# Patient Record
Sex: MALE | NOT HISPANIC OR LATINO | Employment: FULL TIME | URBAN - NONMETROPOLITAN AREA
[De-identification: names, ages, dates, MRNs, and addresses within clinical notes are randomized per-mention and may not be internally consistent; named-entity substitution may affect disease eponyms.]

---

## 2020-02-11 ENCOUNTER — OFFICE VISIT CONVERTED (OUTPATIENT)
Dept: FAMILY MEDICINE CLINIC | Age: 45
End: 2020-02-11
Attending: NURSE PRACTITIONER

## 2020-11-24 ENCOUNTER — HOSPITAL ENCOUNTER (OUTPATIENT)
Dept: OTHER | Facility: HOSPITAL | Age: 45
Discharge: HOME | End: 2020-11-24
Attending: FAMILY MEDICINE

## 2020-11-27 LAB — SARS-COV-2 RNA SPEC QL NAA+PROBE: NOT DETECTED

## 2021-05-18 NOTE — PROGRESS NOTES
"Ella Alicea  1975     Office/Outpatient Visit    Visit Date: Tue, Feb 11, 2020 03:20 pm    Provider: Cherelle Manzo N.P. (Assistant: Spurling, Sarah C, MA)    Location: [facility]        Electronically signed by Cherelle Manzo N.P. on  02/11/2020 03:56:42 PM                             Subjective:        CC: Mr. Alicea is a 44 year old White male.  This is his first visit to the clinic.  sinus symptoms, drainage, hoarseness in his voice.          HPI:           PHQ-9 Depression Screening: Completed form scanned and in chart; Total Score 0           He complains of a sore throat.  This began yesterday.  The discomfort occurs constantly.  Associated symptoms include cough and \"swollen glands\".  He denies chills, fever, headache or OTHER (enter).  He denies exposure to ill contacts.  He has not tried any medications for symptomatic relief.  Medical history is significant for smoking.      ROS:     CONSTITUTIONAL:  Negative for chills and fever.      E/N/T:  Positive for frequent rhinorrhea and sore throat.   Negative for ear pain, nasal congestion or sinus pressure.      CARDIOVASCULAR:  Negative for chest pain and pedal edema.      RESPIRATORY:  Negative for recent cough and dyspnea.      NEUROLOGICAL:  Negative for headaches.          Past Medical History / Family History / Social History:         Last Reviewed on 2/11/2020 03:35 PM by Cherelle Manzo    Past Medical History:             PAST MEDICAL HISTORY     UNREMARKABLE     right middle finger crush         Family History:     Father: Myocardial Infarction     Mother: Healthy         Social History:     Occupation: [employer]     Marital Status: Single     Children: None         Tobacco/Alcohol/Supplements:     Last Reviewed on 2/11/2020 03:35 PM by Cherelle Manzo    Tobacco: Current Smoker: He currently smokes every day, 1 pack per day.          Alcohol: Frequency:    'very rarely - maybe four times a year';        "  Substance Abuse History:     Last Reviewed on 2/11/2020 03:35 PM by Cherelle Manzo        Marijuana: Current use. 'occasional'         Mental Health History:     Last Reviewed on 2/11/2020 03:35 PM by Cherelle Manzo    NEGATIVE         Communicable Diseases (eg STDs):     Last Reviewed on 2/11/2020 03:35 PM by Cherelle Manzo    Reportable health conditions; NEGATIVE         Current Problems:     Last Reviewed on 2/11/2020 03:35 PM by Cherelle Manzo    Nicotine dependence, unspecified, uncomplicated    Acute pharyngitis, unspecified    Encounter for screening for depression        Immunizations:     None        Allergies:     Last Reviewed on 2/11/2020 03:35 PM by Cherelle Manzo    No Known Allergies.        Current Medications:     Last Reviewed on 2/11/2020 03:35 PM by Cherelle Manzo    No Known Medications.        Objective:        Vitals:         Current: 2/11/2020 3:27:10 PM    Ht:  5 ft, 10.75 in;  Wt: 144 lbs;  BMI: 20.2T: 97.6 F (oral);  BP: 119/67 mm Hg (left arm, sitting);  P: 63 bpm (left arm (BP Cuff), sitting)        Exams:     PHYSICAL EXAM:     GENERAL: Vitals recorded well developed, well nourished;  no apparent distress;     E/N/T: EARS: external auditory canal normal bilaterally;  bilateral TMs are normal;  NOSE:  normal nasal mucosa, septum, turbinates, and sinuses; OROPHARYNX: posterior pharynx shows 2+ tonsils, no exudate, and erythema;     NECK: range of motion is normal;     RESPIRATORY: normal appearance and symmetric expansion of chest wall; normal respiratory rate and pattern with no distress; normal breath sounds with no rales, rhonchi, wheezes or rubs;     LYMPHATIC: no enlargement of cervical or facial nodes;     MUSCULOSKELETAL:     NEUROLOGIC: mental status: alert and oriented x 3;     PSYCHIATRIC: appropriate affect and demeanor; normal speech pattern; normal thought and perception;         Assessment:         Z13.31   Encounter for screening for depression        "J02.9   Acute pharyngitis, unspecified       F17.200   Nicotine dependence, unspecified, uncomplicated           ORDERS:         Meds Prescribed:       [New Rx] Lidocaine Viscous 2 % Mucous Membrane Solution [take 15 milliliters and swish and spit out by oral route every 3 hours PRN  ], #100 (one hundred) milliliters, Refills: 0 (zero)       [New Rx] cetirizine 10 mg oral tablet [take 1 tablet (10 mg) by oral route once daily], #30 (thirty) tablets, Refills: 0 (zero)       [New Rx] Medrol (Henrry) 4 mg oral Tablet, Dose Pack [take as directed], #21 (twenty one) unspecified, Refills: 0 (zero)         Other Orders:         Depression screen negative  (In-House)            4004F  Pt scrnd tobacco use rcvd tobacco cessation talk  (In-House)                      Plan:         Encounter for screening for depression    MIPS PHQ-9 Depression Screening: Completed form scanned and in chart; Total Score 0; Negative Depression Screen           Orders:         Depression screen negative  (In-House)              Acute pharyngitis, unspecifiedappears viral - recommended that if no better in 7-10 days, will need to follow up in office  - no s/s strep - reports \"does not feel like strep\" deferred testing today per request and will follow up if no improvement or symptoms change          Prescriptions:       [New Rx] Lidocaine Viscous 2 % Mucous Membrane Solution [take 15 milliliters and swish and spit out by oral route every 3 hours PRN  ], #100 (one hundred) milliliters, Refills: 0 (zero)       [New Rx] cetirizine 10 mg oral tablet [take 1 tablet (10 mg) by oral route once daily], #30 (thirty) tablets, Refills: 0 (zero)       [New Rx] Medrol (Henrry) 4 mg oral Tablet, Dose Pack [take as directed], #21 (twenty one) unspecified, Refills: 0 (zero)         Nicotine dependence, unspecified, uncomplicated        RECOMMENDATIONS given include: Counseled on smoking cessation and advised of the benefits to patient's health if he were to " stop smoking. Counseling for less than 3 minutes.            Orders:       4004F  Pt scrnd tobacco use rcvd tobacco cessation talk  (In-House)                  Patient Recommendations:        For  Nicotine dependence, unspecified, uncomplicated:        Stop smoking.              Charge Capture:         Primary Diagnosis:     Z13.31  Encounter for screening for depression           Orders:      12468  Office visit - new pt, level 3  (In-House)              Depression screen negative  (In-House)              J02.9  Acute pharyngitis, unspecified     F17.200  Nicotine dependence, unspecified, uncomplicated           Orders:      4004F  Pt scrnd tobacco use rcvd tobacco cessation talk  (In-House)

## 2021-07-02 VITALS
TEMPERATURE: 97.6 F | HEIGHT: 71 IN | HEART RATE: 63 BPM | SYSTOLIC BLOOD PRESSURE: 119 MMHG | DIASTOLIC BLOOD PRESSURE: 67 MMHG | WEIGHT: 144 LBS | BODY MASS INDEX: 20.16 KG/M2

## 2022-01-11 ENCOUNTER — TRANSCRIBE ORDERS (OUTPATIENT)
Dept: ADMINISTRATIVE | Facility: HOSPITAL | Age: 47
End: 2022-01-11

## 2022-01-11 DIAGNOSIS — M84.475A: Primary | ICD-10-CM

## 2022-01-12 ENCOUNTER — HOSPITAL ENCOUNTER (OUTPATIENT)
Dept: CT IMAGING | Facility: HOSPITAL | Age: 47
Discharge: HOME | End: 2022-01-12
Admitting: PODIATRIST

## 2022-01-12 DIAGNOSIS — M84.475A: ICD-10-CM

## 2022-01-12 PROCEDURE — 73700 CT LOWER EXTREMITY W/O DYE: CPT

## 2022-01-21 ENCOUNTER — TELEPHONE (OUTPATIENT)
Dept: FAMILY MEDICINE CLINIC | Age: 47
End: 2022-01-21

## 2022-01-21 NOTE — TELEPHONE ENCOUNTER
Caller: Ella Alicea    Relationship: Self    Best call back number: [phone]    What orders are you requesting (i.e. lab or imaging):LAB AND X-RAYS    In what timeframe would the patient need to come in:      WEEK OF 1/24/22    Where will you receive your lab/imaging services:      Additional notes:       PATIENT IS REQUESTING PCP TO PUT IN AN ORDER FOR BLOOD WORK AND X-RAYS.  HE SAID HE IS HAVING SURGERY ON  2/11/22 AND IS NEEDING THOSE DONE BY 2/4/22. THE PATIENT IS WANTING TO HAVE THEM DONE BY NEXT WEEK. HE IS REQUESTING A CALL TO [facility] WHEN HE IS ABLE TO GO AND GET THIS DONE.       HE WOULD NEED THE  INFORMATION  OF HIS X-RAYS AND LABS SENT TO [facility].

## 2022-01-21 NOTE — TELEPHONE ENCOUNTER
Pt has an order for labs and xrays from his surgeon.  Told pt the lab doesn't take appts for regular xrays and labs, he just needs to take a order in with him.

## 2022-01-28 ENCOUNTER — TRANSCRIBE ORDERS (OUTPATIENT)
Dept: ADMINISTRATIVE | Facility: HOSPITAL | Age: 47
End: 2022-01-28

## 2022-01-28 ENCOUNTER — LAB (OUTPATIENT)
Dept: LAB | Facility: HOSPITAL | Age: 47
End: 2022-01-28

## 2022-01-28 ENCOUNTER — HOSPITAL ENCOUNTER (OUTPATIENT)
Dept: GENERAL RADIOLOGY | Facility: HOSPITAL | Age: 47
Discharge: HOME | End: 2022-01-28

## 2022-01-28 ENCOUNTER — HOSPITAL ENCOUNTER (OUTPATIENT)
Dept: GENERAL RADIOLOGY | Facility: HOSPITAL | Age: 47
Discharge: HOME | End: 2022-01-28
Admitting: PODIATRIST

## 2022-01-28 DIAGNOSIS — S92.013A: ICD-10-CM

## 2022-01-28 DIAGNOSIS — M25.572 ARTHRALGIA OF LEFT ANKLE: ICD-10-CM

## 2022-01-28 DIAGNOSIS — R60.9 EDEMA, UNSPECIFIED TYPE: ICD-10-CM

## 2022-01-28 DIAGNOSIS — R60.9 EDEMA, UNSPECIFIED TYPE: Primary | ICD-10-CM

## 2022-01-28 DIAGNOSIS — S92.013A: Primary | ICD-10-CM

## 2022-01-28 DIAGNOSIS — M25.572 ARTHRALGIA OF ANKLE, LEFT: ICD-10-CM

## 2022-01-28 LAB
25(OH)D3 SERPL-MCNC: 14.5 NG/ML (ref 30–100)
BASOPHILS # BLD AUTO: 0.06 10*3/MM3 (ref 0–0.2)
BASOPHILS NFR BLD AUTO: 0.8 % (ref 0–1.5)
DEPRECATED RDW RBC AUTO: 43.1 FL (ref 37–54)
EOSINOPHIL # BLD AUTO: 0.18 10*3/MM3 (ref 0–0.4)
EOSINOPHIL NFR BLD AUTO: 2.5 % (ref 0.3–6.2)
ERYTHROCYTE [DISTWIDTH] IN BLOOD BY AUTOMATED COUNT: 12.2 % (ref 12.3–15.4)
HCT VFR BLD AUTO: 41.7 % (ref 37.5–51)
HGB BLD-MCNC: 13.3 G/DL (ref 13–17.7)
IMM GRANULOCYTES # BLD AUTO: 0.05 10*3/MM3 (ref 0–0.05)
IMM GRANULOCYTES NFR BLD AUTO: 0.7 % (ref 0–0.5)
LYMPHOCYTES # BLD AUTO: 1.52 10*3/MM3 (ref 0.7–3.1)
LYMPHOCYTES NFR BLD AUTO: 21 % (ref 19.6–45.3)
MCH RBC QN AUTO: 30.5 PG (ref 26.6–33)
MCHC RBC AUTO-ENTMCNC: 31.9 G/DL (ref 31.5–35.7)
MCV RBC AUTO: 95.6 FL (ref 79–97)
MONOCYTES # BLD AUTO: 0.63 10*3/MM3 (ref 0.1–0.9)
MONOCYTES NFR BLD AUTO: 8.7 % (ref 5–12)
NEUTROPHILS NFR BLD AUTO: 4.8 10*3/MM3 (ref 1.7–7)
NEUTROPHILS NFR BLD AUTO: 66.3 % (ref 42.7–76)
PLATELET # BLD AUTO: 239 10*3/MM3 (ref 140–450)
PMV BLD AUTO: 10.8 FL (ref 6–12)
RBC # BLD AUTO: 4.36 10*6/MM3 (ref 4.14–5.8)
WBC NRBC COR # BLD: 7.24 10*3/MM3 (ref 3.4–10.8)

## 2022-01-28 PROCEDURE — 85025 COMPLETE CBC W/AUTO DIFF WBC: CPT

## 2022-01-28 PROCEDURE — 71046 X-RAY EXAM CHEST 2 VIEWS: CPT

## 2022-01-28 PROCEDURE — 36415 COLL VENOUS BLD VENIPUNCTURE: CPT

## 2022-01-28 PROCEDURE — 82306 VITAMIN D 25 HYDROXY: CPT
